# Patient Record
Sex: FEMALE | Race: WHITE | Employment: FULL TIME | URBAN - METROPOLITAN AREA
[De-identification: names, ages, dates, MRNs, and addresses within clinical notes are randomized per-mention and may not be internally consistent; named-entity substitution may affect disease eponyms.]

---

## 2021-04-28 ENCOUNTER — TRANSCRIBE ORDER (OUTPATIENT)
Dept: GENERAL RADIOLOGY | Age: 26
End: 2021-04-28

## 2021-04-28 ENCOUNTER — HOSPITAL ENCOUNTER (OUTPATIENT)
Dept: GENERAL RADIOLOGY | Age: 26
Discharge: HOME | End: 2021-04-28
Payer: COMMERCIAL

## 2021-04-28 DIAGNOSIS — T14.8XXA FRACTURE: Primary | ICD-10-CM

## 2021-04-28 DIAGNOSIS — T14.8XXA FRACTURE: ICD-10-CM

## 2021-04-28 PROCEDURE — 73080 X-RAY EXAM OF ELBOW: CPT

## 2022-04-01 ENCOUNTER — OFFICE VISIT (OUTPATIENT)
Dept: PRIMARY CARE CLINIC | Age: 27
End: 2022-04-01
Payer: COMMERCIAL

## 2022-04-01 VITALS
RESPIRATION RATE: 16 BRPM | OXYGEN SATURATION: 98 % | HEART RATE: 72 BPM | SYSTOLIC BLOOD PRESSURE: 113 MMHG | DIASTOLIC BLOOD PRESSURE: 70 MMHG | BODY MASS INDEX: 19.96 KG/M2 | WEIGHT: 119.8 LBS | TEMPERATURE: 99.4 F | HEIGHT: 65 IN

## 2022-04-01 DIAGNOSIS — Z23 ENCOUNTER FOR IMMUNIZATION: Primary | ICD-10-CM

## 2022-04-01 DIAGNOSIS — M79.671 RIGHT FOOT PAIN: ICD-10-CM

## 2022-04-01 PROBLEM — M79.604 PAIN OF RIGHT LOWER EXTREMITY: Status: ACTIVE | Noted: 2022-04-01

## 2022-04-01 PROCEDURE — 99202 OFFICE O/P NEW SF 15 MIN: CPT

## 2022-04-01 PROCEDURE — 90715 TDAP VACCINE 7 YRS/> IM: CPT

## 2022-04-01 RX ORDER — ETONOGESTREL 68 MG/1
IMPLANT SUBCUTANEOUS
COMMUNITY

## 2022-04-01 NOTE — ASSESSMENT & PLAN NOTE
unclear control, changes made today: Rule out DVT. No abnormal findings on today's assessment. Patient educated on potential signs and symptoms of DVT/PE. Patient states understanding and will contact the office or call 911/or go to the emergency department should any of the symptoms occur.

## 2022-04-01 NOTE — PROGRESS NOTES
HPI     Chief Complaint   Patient presents with    Establish Care     establish care, discuss right leg pain x2weeks        HPI:  Zbigniew Crockett is a 31 y.o. female who presents with a 2-week history of right leg pain. During today's visit the patient would like to discuss establishing care with a new provider. Patient would like to discuss immunization status. Right leg pain: Patient endorses sudden onset of right leg pain, with lower right leg edema. Patient denies trauma. Patient states she was standing when this occurred. Painful to walk with positive change in temperature. Patient denies discoloration of extremity. Patient denies shortness of breath or chest pain. No Known Allergies    Current Outpatient Medications   Medication Sig    etonogestreL (Nexplanon) 68 mg impl by SubDERmal route. No current facility-administered medications for this visit. Review of Systems   Constitutional: Negative for malaise/fatigue and weight loss. Eyes: Negative for blurred vision and double vision. Respiratory: Negative for cough and shortness of breath. Cardiovascular: Negative for chest pain, palpitations and leg swelling. Gastrointestinal: Negative for heartburn and nausea. Musculoskeletal: Negative for joint pain and myalgias. Skin: Negative for itching and rash. Neurological: Negative for dizziness, tingling, loss of consciousness, weakness and headaches. Endo/Heme/Allergies: Does not bruise/bleed easily. Psychiatric/Behavioral: Negative for depression. The patient is not nervous/anxious. All other systems reviewed and are negative. Reviewed PmHx, FmHx, SocHx as well as meds and allergies, updated and dated in the chart.     Last PDMP Bina Shah as Reviewed:  Review User Review Instant Review Result   Ant Foster 4/2/2022 12:32 AM Reviewed PDMP [1]        Objective     Visit Vitals  /70 (BP 1 Location: Right arm, BP Patient Position: Sitting, BP Cuff Size: Adult)   Pulse 72   Temp 99.4 °F (37.4 °C) (Oral)   Resp 16   Ht 5' 5\" (1.651 m)   Wt 119 lb 12.8 oz (54.3 kg)   SpO2 98%   BMI 19.94 kg/m²     Physical Exam  Vitals and nursing note reviewed. Constitutional:       Appearance: Normal appearance. She is normal weight. HENT:      Head: Normocephalic. Eyes:      Extraocular Movements: Extraocular movements intact. Conjunctiva/sclera: Conjunctivae normal.      Pupils: Pupils are equal, round, and reactive to light. Cardiovascular:      Rate and Rhythm: Normal rate and regular rhythm. Pulses: Normal pulses. Heart sounds: Normal heart sounds. Pulmonary:      Effort: Pulmonary effort is normal.      Breath sounds: Normal breath sounds. Musculoskeletal:         General: Normal range of motion. Cervical back: Normal range of motion and neck supple. Feet:    Feet:      Comments: Area where previous edema and pain was located. No tactile tenderness, no pitting edema. No temperature deviation from opposing extremities. Skin:     General: Skin is warm and dry. Neurological:      General: No focal deficit present. Mental Status: She is alert and oriented to person, place, and time. Psychiatric:         Mood and Affect: Mood normal.             Assessment and Plan     Diagnoses and all orders for this visit:    1. Encounter for immunization  Assessment & Plan:   asymptomatic, changes made today: Tdap immunization provided today in office. Orders:  -     TETANUS, DIPHTHERIA TOXOIDS AND ACELLULAR PERTUSSIS VACCINE (TDAP), IN INDIVIDS. >=7, IM    2. Right foot pain  Assessment & Plan:   unclear control, changes made today: Rule out DVT. No abnormal findings on today's assessment. Patient educated on potential signs and symptoms of DVT/PE. Patient states understanding and will contact the office or call 911/or go to the emergency department should any of the symptoms occur.     Orders:  -     DUPLEX LOWER EXT VENOUS BILAT; Future       Medication Side Effects and Warnings were discussed with patient. Patient Labs were reviewed and or requested. Patient Past Records were reviewed and or requested. Follow-up and Dispositions    · Return in about 1 month (around 5/1/2022). Please note that this dictation was completed with [employer], the computer voice recognition software. Quite often unanticipated grammatical, syntax, homophones, and other interpretive errors are inadvertently transcribed by the computer software. Please disregard these errors. Please excuse any errors that have escaped final proofreading. I have discussed the diagnosis with the patient and the intended plan as seen in the above orders. The patient has received an after-visit summary and questions were answered concerning future plans. I have discussed medication side effects and warnings with the patient as well. Corey Hodges, [address] [place] [address]  [address]

## 2022-04-01 NOTE — LETTER
NOTIFICATION RETURN TO WORK / SCHOOL    4/1/2022 9:43 AM    [address]. [address] [facility] [address] [address]      To Whom It May Concern:    Eve Mariee is currently under the care of Tony Mcarthur. She will return to work/school on: 4/5/2022    If there are questions or concerns please have the patient contact our office.         Sincerely,      Seth Estrella NP

## 2022-04-01 NOTE — PROGRESS NOTES
Chief Complaint   Patient presents with   Gunner Bullard Establish Care     establish care, discuss right leg pain x2weeks     Visit Vitals  /70 (BP 1 Location: Right arm, BP Patient Position: Sitting, BP Cuff Size: Adult)   Pulse 72   Temp 99.4 °F (37.4 °C) (Oral)   Resp 16   Ht 5' 5\" (1.651 m)   Wt 119 lb 12.8 oz (54.3 kg)   SpO2 98%   BMI 19.94 kg/m²     1. \"Have you been to the ER, urgent care clinic since your last visit? Hospitalized since your last visit? \" No    2. \"Have you seen or consulted any other health care providers outside of the [address] [place] [address] since your last visit? \" No     3. For patients aged 39-70: Has the patient had a colonoscopy / FIT/ Cologuard? NA - based on age      If the patient is female:    4. For patients aged 41-77: Has the patient had a mammogram within the past 2 years? NA - based on age or sex      11. For patients aged 21-65: Has the patient had a pap smear?  Yes - no Care Gap present

## 2022-05-21 ENCOUNTER — HOSPITAL ENCOUNTER (EMERGENCY)
Age: 27
Discharge: HOME | End: 2022-05-21
Attending: STUDENT IN AN ORGANIZED HEALTH CARE EDUCATION/TRAINING PROGRAM
Payer: COMMERCIAL

## 2022-05-21 ENCOUNTER — APPOINTMENT (OUTPATIENT)
Dept: VASCULAR SURGERY | Age: 27
End: 2022-05-21
Attending: EMERGENCY MEDICINE
Payer: COMMERCIAL

## 2022-05-21 ENCOUNTER — APPOINTMENT (OUTPATIENT)
Dept: GENERAL RADIOLOGY | Age: 27
End: 2022-05-21
Attending: EMERGENCY MEDICINE
Payer: COMMERCIAL

## 2022-05-21 VITALS
HEIGHT: 65 IN | DIASTOLIC BLOOD PRESSURE: 82 MMHG | BODY MASS INDEX: 19.66 KG/M2 | HEART RATE: 69 BPM | WEIGHT: 118 LBS | OXYGEN SATURATION: 97 % | TEMPERATURE: 98.5 F | RESPIRATION RATE: 16 BRPM | SYSTOLIC BLOOD PRESSURE: 121 MMHG

## 2022-05-21 DIAGNOSIS — M25.561 ARTHRALGIA OF RIGHT LOWER LEG: Primary | ICD-10-CM

## 2022-05-21 LAB — HCG UR QL: NEGATIVE

## 2022-05-21 PROCEDURE — 93971 EXTREMITY STUDY: CPT

## 2022-05-21 PROCEDURE — 72100 X-RAY EXAM L-S SPINE 2/3 VWS: CPT

## 2022-05-21 PROCEDURE — 81025 URINE PREGNANCY TEST: CPT

## 2022-05-21 PROCEDURE — 99284 EMERGENCY DEPT VISIT MOD MDM: CPT

## 2022-05-21 RX ORDER — METHOCARBAMOL 500 MG/1
500 TABLET, FILM COATED ORAL
Qty: 15 TABLET | Refills: 0 | Status: SHIPPED | OUTPATIENT
Start: 2022-05-21

## 2022-05-21 RX ORDER — METHYLPREDNISOLONE 4 MG/1
TABLET ORAL
Qty: 1 DOSE PACK | Refills: 0 | Status: SHIPPED | OUTPATIENT
Start: 2022-05-21

## 2022-05-21 NOTE — ED TRIAGE NOTES
Patient reports right leg pain for about 2 months. Recently developed swelling. Reports numbness (2 months ),  Yesterday twisted her back and felt  lightheaded. Patient is ambulatory in Triage.

## 2022-05-21 NOTE — ED PROVIDER NOTES
79-year-old female presents emergency room for evaluation right leg pain. Patient states she has had pain in her right leg for the past 2 months. Pain is described as a burning sensation. Pain starts in the back above the buttocks radiates into the buttock and down the back of the right leg. Patient reports intermittent numbness and tingling. She denies any loss of sensation, weakness, loss of bowel bladder control, difficulty urinating or urinary retention. No perineal numbness. No fevers or chills. No dysuria frequency urgency. No blood in urine. No abdominal pain nausea or vomiting. Patient reports a history of scoliosis but no chronic pain. Patient denies fall injury or trauma to the back or the leg. Patient takes ibuprofen intermittently which does improve the pain. No medicines taken prior to arrival today. Patient declines medicine at this time  Non-smoker. No recent travel, trauma, immobilization or surgery or known family history of blood clots. Patient has implanted birth control. Social history:  Non-smoker  Occasional alcohol    The history is provided by the patient. Leg Pain  Pertinent negatives include no chest pain, no abdominal pain and no shortness of breath. Past Medical History:   Diagnosis Date    Scoliosis        No past surgical history on file. No family history on file.     Social History     Socioeconomic History    Marital status:      Spouse name: Not on file    Number of children: Not on file    Years of education: Not on file    Highest education level: Not on file   Occupational History    Not on file   Tobacco Use    Smoking status: Never Smoker    Smokeless tobacco: Never Used   Vaping Use    Vaping Use: Never used   Substance and Sexual Activity    Alcohol use: Not Currently    Drug use: Never    Sexual activity: Not on file   Other Topics Concern    Not on file   Social History Narrative    Not on file     Social Determinants of Health     Financial Resource Strain:     Difficulty of Paying Living Expenses: Not on file   Food Insecurity:     Worried About Running Out of Food in the Last Year: Not on file    Helder of Food in the Last Year: Not on file   Transportation Needs:     Lack of Transportation (Medical): Not on file    Lack of Transportation (Non-Medical): Not on file   Physical Activity:     Days of Exercise per Week: Not on file    Minutes of Exercise per Session: Not on file   Stress:     Feeling of Stress : Not on file   Social Connections:     Frequency of Communication with Friends and Family: Not on file    Frequency of Social Gatherings with Friends and Family: Not on file    Attends [employer] Services: Not on file    Active Member of [address] [place] [address] [employer] or Organizations: Not on file    Attends Club or Organization Meetings: Not on file    Marital Status: Not on file   Intimate Partner Violence:     Fear of Current or Ex-Partner: Not on file    Emotionally Abused: Not on file    Physically Abused: Not on file    Sexually Abused: Not on file   Housing Stability:     Unable to Pay for Housing in the Last Year: Not on file    Number of [place] in the Last Year: Not on file    Unstable Housing in the Last Year: Not on file         ALLERGIES: Patient has no known allergies. Review of Systems   Constitutional: Negative for fever. Respiratory: Negative for shortness of breath. Cardiovascular: Negative for chest pain. Gastrointestinal: Negative for abdominal pain, diarrhea, nausea and vomiting. Genitourinary: Negative for difficulty urinating, dysuria, flank pain, frequency, hematuria, pelvic pain and urgency. Musculoskeletal: Positive for back pain. Negative for arthralgias, myalgias, neck pain and neck stiffness. Skin: Negative for color change, pallor, rash and wound. Neurological: Negative for dizziness, weakness and numbness. All other systems reviewed and are negative.       Vitals:    05/21/22 1642   BP: 121/82   Pulse: 69   Resp: 16   Temp: 98.5 °F (36.9 °C)   SpO2: 97%   Weight: 53.5 kg (118 lb)   Height: 5' 5\" (1.651 m)            Physical Exam  Vitals and nursing note reviewed. Constitutional:       General: She is not in acute distress. Appearance: Normal appearance. She is well-developed and normal weight. She is not ill-appearing or toxic-appearing. HENT:      Head: Normocephalic and atraumatic. Eyes:      Conjunctiva/sclera: Conjunctivae normal.      Pupils: Pupils are equal, round, and reactive to light. Cardiovascular:      Rate and Rhythm: Normal rate and regular rhythm. Heart sounds: Normal heart sounds. Pulmonary:      Effort: Pulmonary effort is normal.      Breath sounds: Normal breath sounds. No wheezing. Chest:      Chest wall: No tenderness. Abdominal:      General: Bowel sounds are normal. There is no distension. Palpations: Abdomen is soft. There is no mass. Tenderness: There is no abdominal tenderness. There is no right CVA tenderness, left CVA tenderness, guarding or rebound. Hernia: No hernia is present. Comments: SOFT NO AORTIC BRUITS, NO FEMORAL BRUITS,   2+ FEMORAL PULSES,   Musculoskeletal:         General: Normal range of motion. Cervical back: Normal range of motion and neck supple. Right lower leg: No edema. Left lower leg: No edema. Comments: NO TLS SPINE PAIN WITH PALPATION. NO REDNESS, RASHES, WARMTH, NO CELLULITIS  MILD RIGHT SI JOINT PAIN WITH PALPATION. NO EDEMA, NO ECCHYMOSIS,   NO STEPOFFS, NO DEFORMITY. NO CVA TENDERNESS BILATERALLY  5/5 FLEXION/EXTENSION OF HIPS BILATERALLY  5/5 GREAT TOE STRENGTH BILATERALLY  2+ PATELLAR REFLEXES BILATERALLY    RIGHT LEG WITH NO REDNESS, WARMTH OR SWELLING. NO OPEN WOUNDS. NO PAIN WITH PALPATION OF CALF  2+ DORSALIS PEDIS, 2+POSTERIOR TIBIALIS. Skin:     General: Skin is warm and dry. Coloration: Skin is not pale. Findings: No erythema or rash. Neurological:      General: No focal deficit present. Mental Status: She is alert and oriented to person, place, and time. Cranial Nerves: No cranial nerve deficit. Motor: No abnormal muscle tone. Coordination: Coordination normal.      Deep Tendon Reflexes: Reflexes are normal and symmetric. Reflexes normal.   Psychiatric:         Mood and Affect: Mood normal.         Behavior: Behavior normal.         Thought Content: Thought content normal.         Judgment: Judgment normal.          MDM  Number of Diagnoses or Management Options  Arthralgia of right lower leg  Diagnosis management comments: 50-year-old female presenting to the emergency room for right leg pain and swelling. The leg is not red hot or swollen. No open wounds. Ambulatory full weightbearing. No direct midline tenderness of the lumbar spine. Plan: Vascular study for DVT, x-ray lumbar spine    INDICATION: pain, right side radiculopathy      EXAM: Lumbar Spine:     Frontal, lateral and coned lateral L5-S1 views show no fracture acute or  subluxation of the lumbar spine. There is L5 anterosuperior margin small  cortical deformity suggesting chronic limbus vertebra. The disc spaces are  preserved. Pedicles appear intact. The soft tissues are unremarkable. There is  levoscoliosis.     IMPRESSION  No acute finding. Levoscoliosis. Vascular study negative for dvt. No signs of infection to leg. The patient is in overall good health. The patient denies a history of IV drug abuse, skin infections, or chronic back problems. The patient has low back pain without signs of spinal cord compression, cauda equina syndrome, infection, abscess, aneurysm, or other medically serious etiology. The patient is neurologically intact. Given the low risk of these diagnoses further testing and evaluation for these possibilities does not appear to be indicated at this time.  The patient has been instructed to return if the symptoms worsen or change in any way. Standard narcotic and sedating medication warnings given  Patient's results have been reviewed with them. Patient and/or family have verbally conveyed their understanding and agreement of the patient's signs, symptoms, diagnosis, treatment and prognosis and additionally agree to follow up as recommended or return to the Emergency Room should their condition change prior to follow-up. Discharge instructions have also been provided to the patient with some educational information regarding their diagnosis as well a list of reasons why they would want to return to the ER prior to their follow-up appointment should their condition change. Amount and/or Complexity of Data Reviewed  Discuss the patient with other providers: yes (ER attending-Guilherme)    Patient Progress  Patient progress: stable         Procedures        Pt case including HPI, PE, and all available lab and radiology results has been discussed with attending physician. Opportunity to evaluate patient has been provided to ER attending. Discharge and prescription plan has been agreed upon.

## 2022-05-21 NOTE — Clinical Note
[address]  [facility] EMERGENCY DEPT  [address] [id number]  [phone]    Work/School Note    Date: 5/21/2022    To Whom It May concern:    Sonam Olson was seen and treated today in the emergency room by the following provider(s):  Attending Provider: Priya Britt MD  Physician Assistant: Dolores Musa PA-C. Sonam Olson is excused from work/school on 05/21/22 and 05/22/22. She is medically clear to return to work/school on 5/23/2022.        Sincerely,          Diana Galaviz PA-C

## 2022-05-21 NOTE — Clinical Note
[address]  [facility] EMERGENCY DEPT  [address] [id number]  [phone]    Work/School Note    Date: 5/21/2022    To Whom It May concern:    Sonam Olson was seen and treated today in the emergency room by the following provider(s):  Attending Provider: Elicia Hernández MD  Physician Assistant: Jose Angel Byrne PA-C. Sonam Olson is excused from work/school on 05/21/22 and 05/22/22. She is medically clear to return to work/school on 5/23/2022.        Sincerely,          Abbey Yeboah PA-C

## 2022-05-22 NOTE — DISCHARGE INSTRUCTIONS
Ibuprofen or Advil: 400 mg every 6 hours as needed for pain  Take your methylprednisone as directed on the package  Methocarbamol: 1 tablet every 8 hours as needed for pain. Be aware of sedating effects. No alcohol or driving with this medicine. Return to ER for any fever, chills, chest pain, shortness of breath or difficulty breathing, numbness or tingling, inability to urinate, loss of bowel or bladder control, weakness. Your study was negative for blood clot today. They did see a few enlarged lymph nodes in your groin. Please follow-up with your primary care doctor for these.